# Patient Record
(demographics unavailable — no encounter records)

---

## 2025-03-04 NOTE — ASSESSMENT
[FreeTextEntry1] :  Stephanie Ware is a 43 y/o male with Chronic HBV, here for follow-up.  #Chronic HBV - 3/5/24 labs: HDV undetected, +HBsAg, HBV PCR undetected, +HBeAb, -HBeAg, & HgbA1c 5.8% > C/w Viread 300 mg/day and update labs  - 2/2023 Fibroscan F0-F1/S1 > will repeat again now - 6/22/24 US Abd: Liver is WNL and no HCC > repeat screening now and reinforced importance of biannual f/u and liver cancer screening  - Will screen for T2DM and HLD and pt counseled on optimizing metabolic profile in efforts of preventing hepatic steatosis - Counseled on safe ETOH consumption - Reinforced importance of judicious use of OTC meds/supps/herbals  Update labs today. RTC in 6 months. Plan discussed with Dr. Haddad.  Eulalia Gutiérrez, MSN, [facility] Transplant Hepatology Nurse Practitioner [facility] [address] [place] [address] T: [phone] | F: [phone].

## 2025-03-04 NOTE — HISTORY OF PRESENT ILLNESS
[FreeTextEntry1] : Transplant Hepatologist: Dr. Celeste Haddad Transplant Hepatology NP: Eulalia Gutiérrez, [facility]  Stephanie Ware is a 41 y/o male with a PMH of HTN, Uveitis, Pre-T2DM and Chronic HBV, here for follow-up.  Patient established care with us in early 2023 as Dr. Remy is no longer with the practice. Pt diagnosed with HBV in 2015 while being screened for blood donation. Possibly contracted via vertical transmission from mother. Wife and child vaccinated against HBV. Chronic HBV (eAb+ and genotype D with precore and BCP mutations on Vemlidy since 2018). F1 fibrosis noted on 1/2021 fibroscan. No episodes of liver decompensation. No prior liver bx. HBV rx changed from Vemlidy to Viread in 7/2023.  - 2/27/23 Fibroscan: F0-F1/S1 - 2/20/23 US Abd w/o c/f malignancy - 3/5/24 labs: HDV undetected, +HBsAg, HBV PCR undetected, +HBeAb, -HBeAg, & HgbA1c 5.8% - 6/22/24 US Abd: Liver is WNL and no HCC  In the interim since last visit, there have been no interim illnesses or hospitalizations. Reports compliance with Vemlidy and denies any missed doses. No f/u appt in 1 year. Patient's allergies, medications, past medical, surgical, family, and social histories were reviewed and updated as appropriate. Seen in clinic today, reports that he feels well and is w/o complaints. Denies any recent fevers, chills, cough, lightheadedness, AMS, abdominal pain, n/v, diarrhea, hematochezia, hematemesis, and melena. Denies tobacco, or recreational drug use. Social ETOH use with ~2-3 drinks/week. Intermittent OTC use of Zinc with illness. No recent abx/steroids.

## 2025-03-04 NOTE — ASSESSMENT
[FreeTextEntry1] :  Stephanie Ware is a 43 y/o male with Chronic HBV, here for follow-up.  #Chronic HBV - 3/5/24 labs: HDV undetected, +HBsAg, HBV PCR undetected, +HBeAb, -HBeAg, & HgbA1c 5.8% > C/w Viread 300 mg/day and update labs  - 2/2023 Fibroscan F0-F1/S1 > will repeat again now - 6/22/24 US Abd: Liver is WNL and no HCC > repeat screening now and reinforced importance of biannual f/u and liver cancer screening  - Will screen for T2DM and HLD and pt counseled on optimizing metabolic profile in efforts of preventing hepatic steatosis - Counseled on safe ETOH consumption - Reinforced importance of judicious use of OTC meds/supps/herbals  Update labs today. RTC in 6 months. Plan discussed with Dr. Haddad.  Eulalia Gutiérrez, MSN, [facility] Transplant Hepatology Nurse Practitioner [facility] [address] [place] [address] T: [phone] | F: [phone].

## 2025-03-04 NOTE — HISTORY OF PRESENT ILLNESS
[FreeTextEntry1] : Transplant Hepatologist: Dr. Celeste Haddad Transplant Hepatology NP: Eulalia Gutiérrez, [facility]  Stephanie Ware is a 43 y/o male with a PMH of HTN, Uveitis, Pre-T2DM and Chronic HBV, here for follow-up.  Patient established care with us in early 2023 as Dr. Remy is no longer with the practice. Pt diagnosed with HBV in 2015 while being screened for blood donation. Possibly contracted via vertical transmission from mother. Wife and child vaccinated against HBV. Chronic HBV (eAb+ and genotype D with precore and BCP mutations on Vemlidy since 2018). F1 fibrosis noted on 1/2021 fibroscan. No episodes of liver decompensation. No prior liver bx. HBV rx changed from Vemlidy to Viread in 7/2023.  - 2/27/23 Fibroscan: F0-F1/S1 - 2/20/23 US Abd w/o c/f malignancy - 3/5/24 labs: HDV undetected, +HBsAg, HBV PCR undetected, +HBeAb, -HBeAg, & HgbA1c 5.8% - 6/22/24 US Abd: Liver is WNL and no HCC  In the interim since last visit, there have been no interim illnesses or hospitalizations. Reports compliance with Vemlidy and denies any missed doses. No f/u appt in 1 year. Patient's allergies, medications, past medical, surgical, family, and social histories were reviewed and updated as appropriate. Seen in clinic today, reports that he feels well and is w/o complaints. Denies any recent fevers, chills, cough, lightheadedness, AMS, abdominal pain, n/v, diarrhea, hematochezia, hematemesis, and melena. Denies tobacco, or recreational drug use. Social ETOH use with ~2-3 drinks/week. Intermittent OTC use of Zinc with illness. No recent abx/steroids.

## 2025-03-04 NOTE — REVIEW OF SYSTEMS
[Fever] : no fever [Chills] : no chills [Feeling Poorly] : not feeling poorly [Recent Weight Gain (___ Lbs)] : no recent weight gain [Recent Weight Loss (___ Lbs)] : no recent weight loss [Chest Pain] : no chest pain [Palpitations] : no palpitations [Shortness Of Breath] : no shortness of breath [Cough] : no cough [Abdominal Pain] : no abdominal pain [Vomiting] : no vomiting [Constipation] : no constipation [Diarrhea] : no diarrhea [Melena] : no melena [Dysuria] : no dysuria [Limb Swelling] : no limb swelling [Itching] : no itching [Confused] : no confusion [Dizziness] : no dizziness